# Patient Record
(demographics unavailable — no encounter records)

---

## 2024-11-08 NOTE — HISTORY OF PRESENT ILLNESS
[FreeTextEntry1] : Ms. ELVIS EVANS  is a 36 year old female  who presents with right cervical radiculopathy since 1/20/24 when she was injured at work. She has neck and right arm pain.  She has done PT and 2 injections with minimal relief.   Normal bowel and bladder control.   Denies any recent fevers, chills, sweats, weight loss, or infection.  The patients past medical history, past surgical history, medications, allergies, and social history were reviewed by me today with the patient and documented accordingly.  In addition, the patient's family history, which is noncontributory to their visit, was also reviewed.

## 2024-11-08 NOTE — PHYSICAL EXAM
[de-identified] : Examination of the cervical spine reveals no midline or paraspinal tenderness to palpation. No cervical lymphadenopathy. Decreased range of motion with respect to flexion, extension, rotation, and lateral bending. Negative Spurlings. Negative Lhermitte's. Full range of motion bilateral shoulders without evidence of impingement. No instability of bilateral upper extremities.  Cranial nerves II through XII grossly intact. Intact sensation bilateral upper extremities. 5/5 deltoids biceps triceps wrist extensors wrist flexors finger flexors and hand intrinsics. 1+ biceps triceps and brachioradialis reflexes. Negative Louis's. 2+ radial pulse. Negative Tinel's over the cubital and carpal tunnel. No skin lesions on the right and left upper extremities. [de-identified] : Review of the cervical spine MRI report notes left-sided C4-5 and C5-6 disc herniations.

## 2024-11-08 NOTE — DISCUSSION/SUMMARY
[de-identified] : Discussed further treatment options with the patient. Her main complaint is neck pain with right-sided arm pain, interestingly, despite the disc herniations being on the left side. She has previously tried physical therapy, with limited benefit, and has also had injections that did not provide relief. I recommended an updated cervical MRI. She will proceed with the recommended follow-up.

## 2024-11-08 NOTE — END OF VISIT
[FreeTextEntry3] : All medical record entries made by the Scribe were at my, Qasim Aparicio MD, direction and personally dictated by me on 11/08/2024. I have reviewed the chart and agree that the record accurately reflects my personal performance of the history, physical exam, assessment and plan. I have also personally directed, reviewed, and agreed with the chart. [Time Spent: ___ minutes] : I have spent [unfilled] minutes of time on the encounter which excludes teaching and separately reported services.

## 2024-11-08 NOTE — ADDENDUM
[FreeTextEntry1] : I, Adina Poole, documented this note as a scribe on behalf of Qasim Aparicio MD. on 11/08/2024.

## 2025-01-10 NOTE — PHYSICAL EXAM
[de-identified] : Examination of the cervical spine reveals no midline or paraspinal tenderness to palpation. No cervical lymphadenopathy. Decreased range of motion with respect to flexion, extension, rotation, and lateral bending. Negative Spurlings. Negative Lhermitte's. Full range of motion bilateral shoulders without evidence of impingement. No instability of bilateral upper extremities.  Cranial nerves II through XII grossly intact. Intact sensation bilateral upper extremities. 5/5 deltoids biceps triceps wrist extensors wrist flexors finger flexors and hand intrinsics. 1+ biceps triceps and brachioradialis reflexes. Negative Louis's. 2+ radial pulse. Negative Tinel's over the cubital and carpal tunnel. No skin lesions on the right and left upper extremities. [de-identified] : cervical MRI shows degenerative changes without significant spinal stenosis.

## 2025-01-10 NOTE — DISCUSSION/SUMMARY
[de-identified] : We reviewed her imaging.  We discussed further treatment options.  She will continue with therapy.  She will let me know of any changes or worsening of her symptoms.